# Patient Record
Sex: FEMALE | Race: WHITE | URBAN - METROPOLITAN AREA
[De-identification: names, ages, dates, MRNs, and addresses within clinical notes are randomized per-mention and may not be internally consistent; named-entity substitution may affect disease eponyms.]

---

## 2017-06-20 PROCEDURE — 99283 EMERGENCY DEPT VISIT LOW MDM: CPT | Performed by: EMERGENCY MEDICINE

## 2017-06-20 PROCEDURE — 99282 EMERGENCY DEPT VISIT SF MDM: CPT

## 2017-06-21 ENCOUNTER — HOSPITAL ENCOUNTER (EMERGENCY)
Facility: CLINIC | Age: 47
Discharge: HOME | End: 2017-06-21
Attending: EMERGENCY MEDICINE | Admitting: EMERGENCY MEDICINE
Payer: COMMERCIAL

## 2017-06-21 VITALS
SYSTOLIC BLOOD PRESSURE: 119 MMHG | DIASTOLIC BLOOD PRESSURE: 71 MMHG | OXYGEN SATURATION: 99 % | RESPIRATION RATE: 16 BRPM | TEMPERATURE: 98.1 F

## 2017-06-21 DIAGNOSIS — T14.8XXA ANIMAL BITE: ICD-10-CM

## 2017-06-21 ASSESSMENT — ENCOUNTER SYMPTOMS
CHILLS: 0
HEADACHES: 0
NECK STIFFNESS: 0
FATIGUE: 0
COUGH: 0
ABDOMINAL PAIN: 0
CHEST TIGHTNESS: 0
APPETITE CHANGE: 0
FEVER: 0
WOUND: 1
NECK PAIN: 0
SHORTNESS OF BREATH: 0

## 2017-06-21 NOTE — ED AVS SNAPSHOT
[facility] Emergency Department    [address] [address]    Phone:  [phone]    Fax:  [phone]                                       Mariya Boland   MRN: [id number]    Department:  [facility] Emergency Department   Date of Visit:  6/20/2017           Patient Information     Date Of Birth          1970        Your diagnoses for this visit were:     Animal bite        You were seen by Kirk Covington MD.        Discharge Instructions       A representative from the [facility] will contact you this morning for further instructions.    24 Hour Appointment Hotline       To make an appointment at any [facility] clinic, call [phone] ([phone]). If you don't have a family doctor or clinic, we will help you find one. [facility] clinics are conveniently located to serve the needs of you and your family.             Review of your medicines      Notice     You have not been prescribed any medications.            Orders Needing Specimen Collection     None      Pending Results     No orders found from 6/19/2017 to 6/22/2017.            Pending Culture Results     No orders found from 6/19/2017 to 6/22/2017.            Pending Results Instructions     If you had any lab results that were not finalized at the time of your Discharge, you can call the ED Lab Result RN at [phone]. You will be contacted by this team for any positive Lab results or changes in treatment. The nurses are available 7 days a week from 10A to 6:30P.  You can leave a message 24 hours per day and they will return your call.        Test Results From Your Hospital Stay               Thank you for choosing [facility]       Thank you for choosing [facility] for your care. Our goal is always to provide you with excellent care. Hearing back from our patients is one way we can continue to improve our services. Please take a few minutes to complete the written survey that you may  "receive in the mail after you visit with us. Thank you!        [employer] Information     [employer] lets you send messages to your doctor, view your test results, renew your prescriptions, schedule appointments and more. To sign up, go to [email or web address] . Click on \"Log in\" on the left side of the screen, which will take you to the Welcome page. Then click on \"Sign up Now\" on the right side of the page.     You will be asked to enter the access code listed below, as well as some personal information. Please follow the directions to create your username and password.     Your access code is: [email or web address]  Expires: 2017  3:19 AM     Your access code will  in 90 days. If you need help or a new code, please call your [facility] clinic or [phone].        Care EveryWhere ID     This is your Care EveryWhere ID. This could be used by other organizations to access your [facility] medical records  [id number]        After Visit Summary       This is your record. Keep this with you and show to your community pharmacist(s) and doctor(s) at your next visit.                  "

## 2017-06-21 NOTE — ED AVS SNAPSHOT
[facility] Emergency Department    [address] [address]    Phone:  [phone]    Fax:  [phone]                                       Mariya Boland   MRN: [id number]    Department:  [facility] Emergency Department   Date of Visit:  6/20/2017           After Visit Summary Signature Page     I have received my discharge instructions, and my questions have been answered. I have discussed any challenges I see with this plan with the nurse or doctor.    ..........................................................................................................................................  Patient/Patient Representative Signature      ..........................................................................................................................................  Patient Representative Print Name and Relationship to Patient    ..................................................               ................................................  Date                                            Time    ..........................................................................................................................................  Reviewed by Signature/Title    ...................................................              ..............................................  Date                                                            Time

## 2017-06-21 NOTE — ED NOTES
bit by raccoon on 6/19.  Patient had 2 baby raccoons that she was taking care of.  From the wild, patient's neighbors found them in their attic.  Patient dropped them off at ([facility]) on [address] in [place].

## 2017-06-21 NOTE — ED PROVIDER NOTES
History     Chief Complaint   Patient presents with     Trauma     bit by raccoon on 6/19.     HPI  Mariya Boland is a 46 year old female with no significant past medical history presenting for evaluation of a raccoon bite to her hand yesterday.  Patient states that she has been caring for 2 baby raccoons over the past 2 weeks which were orphaned.  She reports the infant and raccoon's have been acting normally and yesterday while she was bathing the raccoon, it nipped at her hand injury with a small amount of blood.  Patient reports no abnormal or concerning behavior from the animal.  Animal was turned over to a rehabilitation clinic today ([place], [place], [phone]).  Patient was concerned about possible rabies exposure although she reports she does not believe the baby raccoon displayed any signs of rabies.    I have reviewed the Medications, Allergies, Past Medical and Surgical History, and Social History in the Epic system.    Allergies:   Allergies   Allergen Reactions     Silvadene [Silver Sulfadiazine] Rash         No current facility-administered medications on file prior to encounter.   No current outpatient prescriptions on file prior to encounter.    There is no problem list on file for this patient.      History reviewed. No pertinent surgical history.    Social History   Substance Use Topics     Smoking status: Not on file     Smokeless tobacco: Not on file     Alcohol use Not on file         There is no immunization history on file for this patient.    BMI: There is no height or weight on file to calculate BMI.      Review of Systems   Constitutional: Negative for appetite change, chills, fatigue and fever.   HENT: Negative for congestion.    Respiratory: Negative for cough, chest tightness and shortness of breath.    Cardiovascular: Negative for chest pain.   Gastrointestinal: Negative for abdominal pain.   Musculoskeletal: Negative for neck pain and  neck stiffness.   Skin: Positive for wound (web space left hand).   Neurological: Negative for headaches.   All other systems reviewed and are negative.      Physical Exam   BP: 111/67  Heart Rate: 77  Temp: 97.9  F (36.6  C)  Resp: 16  SpO2: 98 %  Physical Exam   Constitutional: She is oriented to person, place, and time. She appears well-developed and well-nourished. No distress.   HENT:   Head: Normocephalic and atraumatic.   Cardiovascular: Normal rate.    Pulmonary/Chest: Effort normal.   Musculoskeletal: Normal range of motion.   Neurological: She is alert and oriented to person, place, and time.   Skin: Skin is warm and dry.   See attached photo for bite   Psychiatric: She has a normal mood and affect.   Nursing note and vitals reviewed.            ED Course     ED Course     Procedures                 Labs Ordered and Resulted from Time of ED Arrival Up to the Time of Departure from the ED - No data to display     2:49 AM; Discussed with [facility] service - awaiting callback.    2:57 AM: Discussed with Clarisa BONILLA.  [phone]. She will contact her Rabies sepecialist and the [facility] and coordinate with the patient for treatment.     Assessments & Plan (with Medical Decision Making)  46-year-old female presented for evaluation of possible rabies exposure.  Patient has been taking care of to be raccoon's for the past 2 weeks and was accidentally bitten while washing one of them yesterday.  She reports the bite did break the skin and cause some slight bleeding.  She does not believe the baby was rabid as it is otherwise acting appropriately but became concerned so sought evaluation.  Wound does not appear to be infected on exam.  Discussed case with [place] [facility] who will contact the [place] [facility] rabies specialist 1st thing in the morning to discuss case and any recommendations.  Will hold off on treatment for the patient at this time as the animal is currently  contained and could potentially be coring teamed to evaluate for any symptoms of rabies.       I have reviewed the nursing notes.    I have reviewed the findings, diagnosis, plan and need for follow up with the patient.       There are no discharge medications for this patient.      Final diagnoses:   Animal bite       6/20/2017   [facility] EMERGENCY DEPARTMENT     Covington, Kirk Ram MD  06/22/17 [id number]

## 2024-04-24 NOTE — DISCHARGE INSTRUCTIONS
A representative from the [facility] will contact you this morning for further instructions.  
Heterosexual